# Patient Record
Sex: MALE | Race: WHITE
[De-identification: names, ages, dates, MRNs, and addresses within clinical notes are randomized per-mention and may not be internally consistent; named-entity substitution may affect disease eponyms.]

---

## 2021-03-23 ENCOUNTER — HOSPITAL ENCOUNTER (OUTPATIENT)
Dept: HOSPITAL 76 - EMS | Age: 41
End: 2021-03-23
Payer: SELF-PAY

## 2021-03-23 DIAGNOSIS — R56.9: Primary | ICD-10-CM

## 2023-01-10 ENCOUNTER — HOSPITAL ENCOUNTER (OUTPATIENT)
Dept: HOSPITAL 76 - EMS | Age: 43
Discharge: ANOTHER HOSPITAL | End: 2023-01-10
Payer: COMMERCIAL

## 2023-01-10 ENCOUNTER — HOSPITAL ENCOUNTER (EMERGENCY)
Dept: HOSPITAL 76 - ED | Age: 43
LOS: 1 days | Discharge: ANOTHER HOSPITAL | End: 2023-01-11
Payer: COMMERCIAL
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
## 2023-01-10 DIAGNOSIS — F17.200: ICD-10-CM

## 2023-01-10 DIAGNOSIS — Z20.822: ICD-10-CM

## 2023-01-10 DIAGNOSIS — S01.452A: Primary | ICD-10-CM
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
## 2023-01-10 DIAGNOSIS — S01.452A: ICD-10-CM

## 2023-01-10 DIAGNOSIS — S01.411A: ICD-10-CM

## 2023-01-10 DIAGNOSIS — S01.451A: ICD-10-CM

## 2023-01-10 DIAGNOSIS — S01.25XA: Primary | ICD-10-CM

## 2023-01-10 DIAGNOSIS — S01.412A: ICD-10-CM

## 2023-01-10 PROCEDURE — 87633 RESP VIRUS 12-25 TARGETS: CPT

## 2023-01-10 PROCEDURE — 99284 EMERGENCY DEPT VISIT MOD MDM: CPT

## 2023-01-10 PROCEDURE — 99285 EMERGENCY DEPT VISIT HI MDM: CPT

## 2023-01-10 NOTE — ED PHYSICIAN DOCUMENTATION
PD HPI SKIN





- Stated complaint


Stated Complaint: DOG BITE





- Chief complaint


Chief Complaint: Laceration





PD PAST MEDICAL HISTORY





- Past Surgical History


Past Surgical History: No





- Present Medications


Home Medications: 


                                Ambulatory Orders











 Medication  Instructions  Recorded  Confirmed


 


lamoTRIgine [Lamictal Odt] 400 mg PO BID 02/06/14 11/26/16














- Allergies


Allergies/Adverse Reactions: 


                                    Allergies











Allergy/AdvReac Type Severity Reaction Status Date / Time


 


Penicillins Allergy  Rash Verified 01/10/23 21:55














- Social History


Does the pt smoke?: Yes


Smoking Status: Current every day smoker


Does the pt drink ETOH?: Yes


Does the pt have substance abuse?: No





- Immunizations


Immunizations are current?: Yes





- POLST


Patient has POLST: No





Results





- Vitals


Vitals: 





                               Vital Signs - 24 hr











  01/10/23





  21:55


 


Temperature 36.9 C


 


Heart Rate 86


 


Respiratory 16





Rate 


 


Blood Pressure 140/90 H


 


O2 Saturation 98








                                     Oxygen











O2 Source                      Room air

## 2023-01-10 NOTE — ED PHYSICIAN DOCUMENTATION
PD HPI HEAD INJURY





- Stated complaint


Stated Complaint: DOG BITE





- Chief complaint


Chief Complaint: Laceration





- History obtained from


History obtained from: Patient





- History of Present Illness


Mechanism of head injury: Other (dog bite)


Where head injury occurred: Home


Timing - onset: Enter  time (21:00)


Pain level now: 7


Location of injury: Front


Quality of pain: Pain


Associated symptoms: No: LOC, AMS


Contributing factors: No: Anticoagulated, Intoxicated


Recently seen: Not recently seen





- Additional information


Additional information: 





HPI from patient.


Patient presents due to injury sustained at approximately 9 PM tonight when he 

was at home in bed with his dog on the bed next to him; patient says the dog 

suddenly bit him on his face.  No apparent provocation.  Patient says the dog 

has been fully immunized and is up-to-date on immunizations.  


Patient is up-to-date on tetanus prophylaxis, with his most recent tetanus 

booster shot approximately 2 or 3 years ago.


Patient denies visual changes such as blurry or double vision.  Denies loss of 

consciousness.  His dog, the dog that bit him, is a [place].





Review of Systems


Eyes: denies: Loss of vision, Decreased vision, Photophobia


Skin: reports: Laceration (s), Bite / sting


Neurologic: denies: Headache, LOC





PD PAST MEDICAL HISTORY





- Past Medical History


Past Medical History: Yes


Neuro: Seizure disorder (epilepsy)





- Past Surgical History


Past Surgical History: No





- Present Medications


Home Medications: 


                                Ambulatory Orders











 Medication  Instructions  Recorded  Confirmed


 


lamoTRIgine [Lamictal Odt] 400 mg PO BID 02/06/14 11/26/16


 


Amox/Clav 875/125 [Augmentin 1 tablet PO Q12H 10 Days #20 tablet 01/11/23 





875/125 Tab]   














- Allergies


Allergies/Adverse Reactions: 


                                    Allergies











Allergy/AdvReac Type Severity Reaction Status Date / Time


 


Penicillins Allergy  Rash Verified 01/10/23 21:55














- Living Situation


Living Arrangement: reports: At home





- Social History


Does the pt smoke?: Yes


Smoking Status: Current every day smoker


Does the pt drink ETOH?: Yes


Does the pt have substance abuse?: No





- Immunizations


Immunizations are current?: Yes





- POLST


Patient has POLST: No





PD ED PE NORMAL





- Vitals


Vital signs reviewed: Yes





- General


General: Alert and oriented X 3, Well developed/nourished, Other (appears 

uncomfortable at times during H+P due to pain)





- HEENT


HEENT: PERRL, EOMI





PD ED PE EXPANDED





- HEENT


HEENT Visual: 


                            __________________________














                            __________________________





 1 - tenderness (both lacerations and avulsed skin noted; no bone/cartilage 

exposure. no septal hematoma either nare)





 2 - laceration (sub-centimeter (approximately 5-8 mm) laceration adjacent to 

medial canthus)





 3 - bruising, swelling, tenderness





 4 - abrasion, laceration (V-shaped lesion, vertex of which is abrasion, the 

lateral end of the inferior defect is a 1 cm laceration, lateral end of superior

aspect is 3 cm laceration)








Results





- Vitals


Vitals: 


                               Vital Signs - 24 hr











  01/10/23 01/11/23





  21:55 03:24


 


Temperature 36.9 C 


 


Heart Rate 86 67


 


Respiratory 16 17





Rate  


 


Blood Pressure 140/90 H 119/76


 


O2 Saturation 98 96








                                     Oxygen











O2 Source                      Room air

















- Labs


Labs: 


                                Laboratory Tests











  01/11/23





  02:15


 


Nasal Adenovirus (PCR)  NOT DETECTED


 


Nasal B. parapertussis DNA (PCR)  NOT DETECTED


 


Nasal Coronavir 229E PCR  NOT DETECTED


 


Nasal Coronavir HKU1 PCR  NOT DETECTED


 


Nasal Coronavir NL63 PCR  NOT DETECTED


 


Nasal Coronavir OC43 PCR  NOT DETECTED


 


Nasal Enterovir/Rhinovir PCR  NOT DETECTED


 


Nasal Influenza B PCR  NOT DETECTED


 


Nasal Influenza A PCR  NOT DETECTED


 


Nasal Parainfluen 1 PCR  NOT DETECTED


 


Nasal Parainfluen 2 PCR  NOT DETECTED


 


Nasal Parainfluen 3 PCR  NOT DETECTED


 


Nasal Parainfluen 4 PCR  NOT DETECTED


 


Nasal RSV (PCR)  NOT DETECTED


 


Nasal B.pertussis DNA PCR  NOT DETECTED


 


Nasal C.pneumoniae (PCR)  NOT DETECTED


 


Blake Human Metapneumo PCR  NOT DETECTED


 


Nasal M.pneumoniae (PCR)  NOT DETECTED


 


Nasal SARS-CoV-2 (PCR)  NOT DETECTED














PD Medical Decision Making





- ED course


Complexity details: re-evaluated patient, considered differential, d/w patient, 

d/w consultant (Dr. Tyler House)


ED course: 





patient is given 5mg oxycodone x 2 during ED stay with good pain relief (both by

visual appearance as well as patient report of pain relief). He is also given 

875 mg PO Augmentin for wound prophylaxis. 


The most concerning finding on exam is the laceration that is immediately adjac

ent to the right medial canthus, directly over region of the nasolacrimal 

duct/sac. I also note recurrent tearing of the right eye (notably not on the 

left) on initial exam as well as (consistently) on multiple reevaluations during

his ED stay. This raises the concern of injury to the tear drainage system. 


I discussed this case with Dr. Tyler House (OM at [facility]); he shares the same concern and recommends transfer to appropriate 

specialist or subspecialist (such as occulopastics). I then contacted [facility] and 

was put in touch with Dr. Sow, on-call ophthalmology. He agrees that patient

is appropriate for transfer to [facility]. Plan is to transfer patient to [facility] ED where 

ophthalmology can be consulted with repair of the lacerations deferred until he 

is evaluated at [facility] ED. As per my conversation with Dr. Sow and his 

recommendation, moist gauze was placed over the areas of injury and I instructed

patient to endeavor to keep these applied until he is evaluated in the [facility] ED.  


Patient is agreeable and comfortable with this plan. We discussed options for 

transfer and patient prefers to be driven there by family. Transfer by private 

vehicle is appropriate for this case. Patient understands he is not to drive, 

and there is a family member in the ED at bedside who confirms that she will 

drive patient home and that another family member will be there to then immed

iately take patient to [facility]. He voices understanding that he is not to stay home 

for any length of time, with plan being to immediately proceed to [facility]. I did 

offer transfer by BLS but patient and family are comfortable/confident with 

private vehicle transfer. 


I did electronically submit a prescription for a course of augmentin to [facility]

pharm[facility] in [place]. I explained to patient that should the treating 

physician(s) at [facility] recommend a different antibiotic, I would defer to their 

judgment, but that if they agree with the augmentin rx, this has already been 

submitted to his pharmacy. Patient tells me he has no known drug allergies. I 

mentioned to him that [employer] indicates a penicillin allergy, which he says is 

incorrect (he says initially this was an allergy he was told he had in [facility], but that he recently was given penicillin for an infectious issue and that

he had no adverse effect throughout a full course of this antibiotic). 





- Consults


Consults: Consulted (name) (Dr. Tyler House)





Departure





- Departure


Disposition: 02 Transfer Acute Care Hosp


Clinical Impression: 


Dog bite of face


Qualifiers:


 Encounter type: initial encounter Qualified Code(s): S01.85XA - Open bite of 

other part of head, initial encounter





Condition: Good


Instructions:  ED Bite Dog


Prescriptions: 


Amox/Clav 875/125 [Augmentin 875/125 Tab] 1 tablet PO Q12H 10 Days #20 tablet


Comments: 


I have discussed your case with the ophthalmologist on-call at [facility] and he agrees that it would be best for you to be evaluated in 

the [facility] emergency department.  It is reasonable to have someone drive you

there (this is called transfer by private vehicle), but, as we discussed, it is 

paramount that you get there as soon as you can. 


You will likely also need repair (stitches) of some of the facial lacerations 

and possibly the nasal injury.  These wounds can be evaluated and repaired in 

the [facility] emergency department.


I have electronically submitted a prescription for an antibiotic (Augmentin) to 

the [facility] pharmacy in [place].  If any of the physicians that evaluate you 

at [facility] prescribe you a different antibiotic, I would defer to their 

judgment (in other words, you can go with what ever antibiotic or antibiotics 

they prescribe instead of the Augmentin.)  However, mention to the physicians 

that evaluate you at [facility] that I have provided you an Augmentin 

prescription, so that you do not end up getting two prescriptions for the same 

antibiotic.


Discharge Date/Time: 01/11/23 04:04

## 2023-01-11 VITALS — SYSTOLIC BLOOD PRESSURE: 119 MMHG | DIASTOLIC BLOOD PRESSURE: 76 MMHG

## 2023-01-11 LAB
B PARAPERT DNA SPEC QL NAA+PROBE: NOT DETECTED
B PERT DNA SPEC QL NAA+PROBE: NOT DETECTED
C PNEUM DNA NPH QL NAA+NON-PROBE: NOT DETECTED
FLUAV RNA RESP QL NAA+PROBE: NOT DETECTED
HAEM INFLU B DNA SPEC QL NAA+PROBE: NOT DETECTED
HCOV 229E RNA SPEC QL NAA+PROBE: NOT DETECTED
HCOV HKU1 RNA UPPER RESP QL NAA+PROBE: NOT DETECTED
HCOV NL63 RNA ASPIRATE QL NAA+PROBE: NOT DETECTED
HCOV OC43 RNA SPEC QL NAA+PROBE: NOT DETECTED
HMPV AG SPEC QL: NOT DETECTED
HPIV1 RNA NPH QL NAA+PROBE: NOT DETECTED
HPIV2 SPEC QL CULT: NOT DETECTED
HPIV3 AB TITR SER CF: NOT DETECTED {TITER}
HPIV4 RNA SPEC QL NAA+PROBE: NOT DETECTED
M PNEUMO DNA SPEC QL NAA+PROBE: NOT DETECTED
RSV RNA RESP QL NAA+PROBE: NOT DETECTED
RV+EV RNA SPEC QL NAA+PROBE: NOT DETECTED
SARS-COV-2 RNA PNL SPEC NAA+PROBE: NOT DETECTED

## 2024-09-04 ENCOUNTER — HOSPITAL ENCOUNTER (OUTPATIENT)
Dept: HOSPITAL 76 - SDS | Age: 44
Discharge: HOME | End: 2024-09-04
Attending: SURGERY
Payer: COMMERCIAL

## 2024-09-04 VITALS — OXYGEN SATURATION: 98 % | DIASTOLIC BLOOD PRESSURE: 64 MMHG | SYSTOLIC BLOOD PRESSURE: 100 MMHG

## 2024-09-04 DIAGNOSIS — Z12.11: Primary | ICD-10-CM

## 2024-09-04 DIAGNOSIS — K63.5: ICD-10-CM

## 2024-09-04 DIAGNOSIS — G40.909: ICD-10-CM

## 2024-09-04 DIAGNOSIS — F17.210: ICD-10-CM

## 2024-09-04 PROCEDURE — 0DBP8ZX EXCISION OF RECTUM, VIA NATURAL OR ARTIFICIAL OPENING ENDOSCOPIC, DIAGNOSTIC: ICD-10-PCS | Performed by: SURGERY

## 2024-09-04 PROCEDURE — 45385 COLONOSCOPY W/LESION REMOVAL: CPT

## 2024-09-04 RX ADMIN — SODIUM CHLORIDE, POTASSIUM CHLORIDE, SODIUM LACTATE AND CALCIUM CHLORIDE ONE MLS: 600; 310; 30; 20 INJECTION, SOLUTION INTRAVENOUS at 09:59

## 2024-09-04 RX ADMIN — SODIUM CHLORIDE, POTASSIUM CHLORIDE, SODIUM LACTATE AND CALCIUM CHLORIDE ONE MLS: 600; 310; 30; 20 INJECTION, SOLUTION INTRAVENOUS at 12:09

## 2024-09-04 NOTE — ANESTHESIA
Pre-Anesthesia VS, & Labs





- Diagnosis





screening





- Procedure





colonoscopy


Vital Signs: 





                                        











Temp Pulse Resp BP Pulse Ox O2 Flow Rate


 


 36.8 C   65   20   109/70   98    


 


 09/04/24 10:20  09/04/24 10:20  09/04/24 10:20  09/04/24 10:20  09/04/24 10:20 

 











Height: 5 ft 8 in


Weight (kg): 83 kg


Body Mass Index: 27.8


BMI Classification: Overweight





- NPO


>8 hours





Home Medications and Allergies


Home Medications: 


Ambulatory Orders





Divalproex ER [Depakote ER] 250 mg PO BID 09/03/24 











                                        





lamoTRIgine [Lamictal Odt] 400 mg PO BID 02/06/14 


Divalproex ER [Depakote ER] 250 mg PO BID 09/03/24 








Allergies/Adverse Reactions: 


                                    Allergies











Allergy/AdvReac Type Severity Reaction Status Date / Time


 


No Known Drug Allergies Allergy   Verified 09/04/24 10:26














Anes History & Medical History





- Anesthetic History


Anesthesia Complications: reports: No previous complications


Family history of Anesthesia Complications: Denies


Family history of Malignant Hyperthermia: Denies





- Medical History


Cardiovascular: reports: None


Pulmonary: reports: None


Gastrointestinal: reports: GERD


Urinary: reports: None


Neuro: reports: Seizure disorder (epilepsy)


Musculoskeletal: reports: Other


Endocrine/Autoimmune: reports: None


Skin: reports: Other


Smoking Status: Current every day smoker


Psychosocial: reports: Alcohol


History of Cancer?: No





Exam


General: Alert, Oriented x3, Cooperative


Dental: Poor dentition


Mouth Opening: 3 Fingerbreadth


Neck Mobility: Normal


Mallampati classification: II


Thyromental Distance: 4-6 cm


Respiratory: Lungs clear


Cardiovascular: Regular rate





Plan


Anesthesia Type: General, Total IV


Consent for Procedure(s) Verified and Reviewed: Yes


Code Status: Attempt Resuscitation


ASA classification: 2-Mild systemic disease


Is this case an emergency?: No

## 2024-09-04 NOTE — HISTORY & PHYSICAL EXAMINATION
PMH/PSH





- Past Medical History


Cardiovascular: positive: None


Respiratory: positive: None


Neuro: positive: Seizure disorder (epilepsy)


Endocrine/Autoimmune: positive: None


GI: positive: GERD


: positive: None


HEENT: positive: Other


Psych: positive: None


Musculoskeletal: positive: Other


Derm: positive: Other


MRSA Hx?: No





Social & Family Hx





- Social History


Does the pt smoke?: Yes


Smoking Status: Current every day smoker


Does the pt drink ETOH?: Yes


Does the pt have substance abuse?: No





- POLST


Patient has POLST: No





Meds/Allgy





- Home Medications


Home Medications: 


                                Ambulatory Orders











 Medication  Instructions  Recorded  Confirmed


 


lamoTRIgine [Lamictal Odt] 400 mg PO BID 02/06/14 09/04/24


 


Divalproex ER [Depakote ER] 250 mg PO BID 09/03/24 09/04/24














- Allergies


Allergies/Adverse Reactions: 


                                    Allergies











Allergy/AdvReac Type Severity Reaction Status Date / Time


 


No Known Drug Allergies Allergy   Verified 09/04/24 10:26














Impression/Plan





- Problem List


Problem List: 





Pre-op H&P





I am asked to see Christopher for a screening colonoscopy examination. 


 


GI symptoms:   None


Family history of colon cancer/polyps:    + Grandparents


Personal history of colon polyps:   N/A


Last colonoscopy examination:  N/A





Anticoagulant use:  No 





Meds: Lamictal; Depakote





Allergies:None





PMH:  Epilepsy





PSH:  N/A





SH:  Cig: No  ETOH:  Social 





The Past Family, Social and Personal History has been reviewed with the patient.





ROS


Denies fevers, chills, night sweats, shortness of breath, chest pain, change in 

the color of skin or urine, diarrhea, constipation, hematemesis, hematochezia, 

headache, visual changes, muscle aches.





PE


VSS, Afeb


HEENT:  Pupils equal, round and reactive to light, sclera anicteric, normal 

hearing, oral mucous membranes moist and without lesions


NECK:  Supple without lymphadenopathy, thyromegaly or carotid bruits


LUNGS:  Clear to auscultation without wheezing


HEART:  NSR without murmurs


CHEST:  Equal and symmetric expansion, no rib pain


ABD:  Soft, nontender, no hepatosplenomegaly, no hernias


GROIN:  No hernias or lymphadenopathy


EXTREMITIES:  Normal neuro and muscular exam


SKIN:  Anicteric





Radiologic Studies


N/A





Assessment:


Request for a screening colonoscopy examination.





Plan:


Screening colonoscopy under sedation through the Day Surgery admission protocol 

at [facility].  





Consent:


Christopher has been counseled for the procedure, it's indications, risks, benefits 

and expected outcome as well as alternative therapies.  





We specifically discussed risks associated with anesthesia and insertion of the 

endoscope into the large intestine which includes bleeding and injury to the 

colon which may require surgical intervention.  





Christopher understands, agrees, and consents to the proposed operative strategy and 

requests that we proceed with the procedure as outlined in our discussion.








Noam Barton MD, [facility]


General Surgery Service

## 2024-09-04 NOTE — ANESTHESIA POST OP EVALUATION
Anesthesia Post Eval





- Post Anesthesia Eval


Vitals: 





                                Last Vital Signs











Temp  36.7 C   09/04/24 12:09


 


Pulse  64   09/04/24 13:03


 


Resp  17   09/04/24 13:03


 


BP  100/64   09/04/24 13:03


 


Pulse Ox  98   09/04/24 13:03


 


O2 Flow Rate      











CV Function Including HR & BP: Stable


Pain Control: Satisfactory


Nausea & Vomiting: Negative


Mental Status: Baseline


Respiratory Status: Airway Patent


Hydration Status: Satisfactory


Anesthesia Complications: None

## 2024-09-13 ENCOUNTER — HOSPITAL ENCOUNTER (OUTPATIENT)
Dept: HOSPITAL 76 - LAB.S | Age: 44
Discharge: HOME | End: 2024-09-13
Payer: COMMERCIAL

## 2024-09-13 DIAGNOSIS — Z13.220: ICD-10-CM

## 2024-09-13 DIAGNOSIS — Z13.0: ICD-10-CM

## 2024-09-13 DIAGNOSIS — Z13.228: Primary | ICD-10-CM

## 2024-09-13 DIAGNOSIS — Z13.29: ICD-10-CM

## 2024-09-13 LAB
ALBUMIN DIAFP-MCNC: 4.5 G/DL (ref 3.2–5.5)
ALBUMIN/GLOB SERPL: 1.7 {RATIO} (ref 1–2.2)
ALP SERPL-CCNC: 36 IU/L (ref 42–121)
ALT SERPL W P-5'-P-CCNC: 17 IU/L (ref 10–60)
ANION GAP SERPL CALCULATED.4IONS-SCNC: 5 MMOL/L (ref 6–13)
AST SERPL W P-5'-P-CCNC: 14 IU/L (ref 10–42)
BASOPHILS NFR BLD AUTO: 0.1 10^3/UL (ref 0–0.1)
BASOPHILS NFR BLD AUTO: 1.9 %
BILIRUB BLD-MCNC: 0.5 MG/DL (ref 0.2–1)
BUN SERPL-MCNC: 14 MG/DL (ref 6–20)
CALCIUM UR-MCNC: 9.4 MG/DL (ref 8.5–10.3)
CHLORIDE SERPL-SCNC: 104 MMOL/L (ref 101–111)
CHOLEST SERPL-MCNC: 192 MG/DL
CO2 SERPL-SCNC: 28 MMOL/L (ref 21–32)
CREAT SERPLBLD-SCNC: 0.9 MG/DL (ref 0.6–1.3)
EOSINOPHIL # BLD AUTO: 0.3 10^3/UL (ref 0–0.7)
EOSINOPHIL NFR BLD AUTO: 5.1 %
ERYTHROCYTE [DISTWIDTH] IN BLOOD BY AUTOMATED COUNT: 13 % (ref 12–15)
GFRSERPLBLD MDRD-ARVRAT: 92 ML/MIN/{1.73_M2} (ref 89–?)
GLOBULIN SER-MCNC: 2.6 G/DL (ref 2.1–4.2)
GLUCOSE SERPL-MCNC: 95 MG/DL (ref 74–104)
HCT VFR BLD AUTO: 48.4 % (ref 42–52)
HDLC SERPL-MCNC: 61 MG/DL
HDLC SERPL: 3.1 {RATIO} (ref ?–5)
HGB UR QL STRIP: 15.9 G/DL (ref 14–18)
LDLC SERPL CALC-MCNC: 120 MG/DL
LDLC/HDLC SERPL: 2 {RATIO} (ref ?–3.6)
LYMPHOCYTES # SPEC AUTO: 2.5 10^3/UL (ref 1.5–3.5)
LYMPHOCYTES NFR BLD AUTO: 39.7 %
MCH RBC QN AUTO: 29 PG (ref 27–31)
MCHC RBC AUTO-ENTMCNC: 32.9 G/DL (ref 32–36)
MCV RBC AUTO: 88.3 FL (ref 80–94)
MONOCYTES # BLD AUTO: 0.5 10^3/UL (ref 0–1)
MONOCYTES NFR BLD AUTO: 7.3 %
NEUTROPHILS # BLD AUTO: 2.9 10^3/UL (ref 1.5–6.6)
NEUTROPHILS # SNV AUTO: 6.3 X10^3/UL (ref 4.8–10.8)
NEUTROPHILS NFR BLD AUTO: 45.8 %
NRBC # BLD AUTO: 0 /100WBC
NRBC # BLD AUTO: 0 X10^3/UL
PDW BLD AUTO: 9.7 FL (ref 7.4–11.4)
PLATELET # BLD: 439 10^3/UL (ref 130–450)
POTASSIUM SERPL-SCNC: 4.7 MMOL/L (ref 3.5–4.5)
PROT SPEC-MCNC: 7.1 G/DL (ref 6.4–8.9)
RBC MAR: 5.48 10^6/UL (ref 4.7–6.1)
SODIUM SERPLBLD-SCNC: 137 MMOL/L (ref 135–145)
TRIGL P FAST SERPL-MCNC: 56 MG/DL
TSH SERPL-ACNC: 2 UIU/ML (ref 0.34–5.6)
VLDLC SERPL-SCNC: 11 MG/DL

## 2024-09-13 PROCEDURE — 84443 ASSAY THYROID STIM HORMONE: CPT

## 2024-09-13 PROCEDURE — 80061 LIPID PANEL: CPT

## 2024-09-13 PROCEDURE — 83721 ASSAY OF BLOOD LIPOPROTEIN: CPT

## 2024-09-13 PROCEDURE — 85025 COMPLETE CBC W/AUTO DIFF WBC: CPT

## 2024-09-13 PROCEDURE — 36415 COLL VENOUS BLD VENIPUNCTURE: CPT

## 2024-09-13 PROCEDURE — 80053 COMPREHEN METABOLIC PANEL: CPT

## 2024-09-16 ENCOUNTER — HOSPITAL ENCOUNTER (OUTPATIENT)
Dept: HOSPITAL 76 - DI.S | Age: 44
Discharge: HOME | End: 2024-09-16
Payer: COMMERCIAL

## 2024-09-16 DIAGNOSIS — M25.812: ICD-10-CM

## 2024-09-16 DIAGNOSIS — M19.012: Primary | ICD-10-CM

## 2024-09-17 NOTE — XRAY REPORT
PROCEDURE:  Shoulder 2+V LT

 

INDICATIONS:  LT SHOULDER PAIN

 

TECHNIQUE:  3 views of the shoulder were acquired.  

 

COMPARISON:  None.

 

FINDINGS:  

 

Bones:  No fractures or dislocations.  No suspicious bony lesions.  Visualized ribs appear intact.   
Moderate acromioclavicular and severe glenohumeral degenerative narrowing. There is significant scler
osis of bone-on-bone with periarticular osteophytes. Multiple areas of calcific density are present o
verlying the joint space.

 

Soft tissues:  No suspicious soft tissue calcifications.  The visualized lungs are within normal limi
ts.  

 

 

IMPRESSION:  

 

Severe left shoulder arthritic change. Multiple areas of calcification are present. These may represe
nt dystrophic calcifications, osteophytes and/or loose bodies. Further evaluation with CT/MRI is ratna
mmended.

 

 

 

Reviewed by: Lyndsey Garcia MD on 9/17/2024 3:04 PM PDT

Approved by: Lyndsey Garcia MD on 9/17/2024 3:04 PM PDT

 

 

Station ID:  [id number]